# Patient Record
Sex: FEMALE | Race: WHITE | NOT HISPANIC OR LATINO | Employment: FULL TIME | URBAN - METROPOLITAN AREA
[De-identification: names, ages, dates, MRNs, and addresses within clinical notes are randomized per-mention and may not be internally consistent; named-entity substitution may affect disease eponyms.]

---

## 2023-11-29 ENCOUNTER — OFFICE VISIT (OUTPATIENT)
Dept: FAMILY MEDICINE | Facility: CLINIC | Age: 33
End: 2023-11-29
Payer: COMMERCIAL

## 2023-11-29 VITALS
SYSTOLIC BLOOD PRESSURE: 126 MMHG | HEART RATE: 50 BPM | RESPIRATION RATE: 16 BRPM | OXYGEN SATURATION: 98 % | WEIGHT: 147 LBS | DIASTOLIC BLOOD PRESSURE: 79 MMHG | HEIGHT: 66 IN | BODY MASS INDEX: 23.63 KG/M2 | TEMPERATURE: 97 F

## 2023-11-29 DIAGNOSIS — Z71.84 TRAVEL ADVICE ENCOUNTER: Primary | ICD-10-CM

## 2023-11-29 DIAGNOSIS — Z29.89 ALTITUDE SICKNESS PREVENTATIVE MEASURES: ICD-10-CM

## 2023-11-29 PROCEDURE — 90717 YELLOW FEVER VACCINE SUBQ: CPT | Mod: GA | Performed by: NURSE PRACTITIONER

## 2023-11-29 PROCEDURE — 99403 PREV MED CNSL INDIV APPRX 45: CPT | Mod: 25 | Performed by: NURSE PRACTITIONER

## 2023-11-29 PROCEDURE — 90715 TDAP VACCINE 7 YRS/> IM: CPT | Mod: GA | Performed by: NURSE PRACTITIONER

## 2023-11-29 PROCEDURE — 90472 IMMUNIZATION ADMIN EACH ADD: CPT | Mod: GA | Performed by: NURSE PRACTITIONER

## 2023-11-29 PROCEDURE — 90471 IMMUNIZATION ADMIN: CPT | Mod: GA | Performed by: NURSE PRACTITIONER

## 2023-11-29 PROCEDURE — 90632 HEPA VACCINE ADULT IM: CPT | Mod: GA | Performed by: NURSE PRACTITIONER

## 2023-11-29 PROCEDURE — 90691 TYPHOID VACCINE IM: CPT | Mod: GA | Performed by: NURSE PRACTITIONER

## 2023-11-29 RX ORDER — AZITHROMYCIN 500 MG/1
500 TABLET, FILM COATED ORAL DAILY
Qty: 3 TABLET | Refills: 0 | Status: SHIPPED | OUTPATIENT
Start: 2023-11-29 | End: 2023-12-02

## 2023-11-29 RX ORDER — ACETAZOLAMIDE 125 MG/1
TABLET ORAL
Qty: 10 TABLET | Refills: 0 | Status: SHIPPED | OUTPATIENT
Start: 2023-11-29

## 2023-11-29 RX ORDER — ONDANSETRON 4 MG/1
4 TABLET, ORALLY DISINTEGRATING ORAL EVERY 8 HOURS PRN
Qty: 10 TABLET | Refills: 0 | Status: SHIPPED | OUTPATIENT
Start: 2023-11-29 | End: 2024-06-17

## 2023-11-29 RX ORDER — ATOVAQUONE AND PROGUANIL HYDROCHLORIDE 250; 100 MG/1; MG/1
1 TABLET, FILM COATED ORAL DAILY
Qty: 16 TABLET | Refills: 0 | Status: SHIPPED | OUTPATIENT
Start: 2023-11-29

## 2023-11-29 ASSESSMENT — PAIN SCALES - GENERAL: PAINLEVEL: NO PAIN (0)

## 2023-11-29 NOTE — PATIENT INSTRUCTIONS
Thank you for visiting the [facility] : [phone]  Today November 29, 2023 you received the    Hepatitis A Vaccine - Please return on 5/27/24 or later for your 2nd and final dose.    Tetanus (Tdap) Vaccine    Typhoid - injectable. This vaccine is valid for two years.     Yellow Fever     Get Flu and Covid on your own    Follow up vaccine appointments can be made as a NURSE ONLY visit at the Travel Clinic, (BE PREPARED TO WAIT, ) or at designated [facility].    If you are receiving the Rabies vaccines series, it is important that you follow the exact schedule ordered.     Pre-travel     We recommend that you purchase Medical Evacuation Insurance prior to your departure.  Https://wwwnc.cdc.gov/travel/page/insurance    [place] your travel plans with the [place] Department of State through STEP ( Smart Traveler Enrollment Program ) https://step.[employer].gov.  STEP is a free service to allow [place] citizens and nationals traveling and living abroad to enroll their trip with the nearest [place] Embassy or Consulate.    Animal Exposure: Avoid all mammals even if they look healthy.  If there is a bite, scratch or even a lick, wash area immediately with soap and water for 15 minutes and seek medical care within 24 hours for evaluation of Rabies post exposure treatment.  Contact your Medical Evacuation Insurance.    Repiratory illness prevention strategies ( Covid and Influenza ) CDC recommendations:  Consider wearing a mask in crowded or poorly ventilated indoor areas, including on public transportation and in transportation hubs.  Hand washing: frequent, thorough handwashing with soap and water for 20 seconds. Use an alcohol-based hand  with at least 60% alcohol if soap and water are not readily available. Avoid touching face, nose, eyes, mouth unless you have done appropriate hand washing as above.  VACCINES: Staying up to date on COVID-19 vaccines significantly lowers the  risk of getting very sick, being hospitalized, or dying from COVID-19. CDC recommends that everyone stay up to date on their COVID-19 vaccines, especially people with weakened immune systems.    Travel Covid 19 Testing:  updated 12/06/2021  International travelers: Pre-travel:  See country specific Embassy websites or airline websites.    Post travel: CDC recommends getting tested 3-5 days after your trip     Post-travel illness:  Contact your provider or [facility] if you develop a fever, rash, cough, diarrhea or other symptoms for up to 1 year after travel.  Inform your healthcare provider when and where you traveled to.    Please call the [facility] with any questions [phone]  Or send your provider a 'My Chart' note.

## 2023-11-29 NOTE — PROGRESS NOTES
"Nurse Note ( Pre-Travel Consult)    Itinerary:  [place]    Departure Date: 1/14/24    Return Date: 1/28/24    Length of Trip 2 weeks    Reason for Travel: Tourism         Urban or rural: both    Accommodations: [employer]        IMMUNIZATION HISTORY  Have you received any immunizations within the past 4 weeks?  No  Have you ever fainted from having your blood drawn or from an injection?  No  Have you ever had a fever reaction to vaccination?  No  Have you ever had any bad reaction or side effect from any vaccination?  No  Have you ever had hepatitis A or B vaccine?  Yes  Do you live (or work closely) with anyone who has AIDS, an AIDS-like condition, any other immune disorder or who is on chemotherapy for cancer?  No  Do you have a family history of immunodeficiency?  No  Have you received any injection of immune globulin or any blood products during the past 12 months?  No    Patient roomed by Jens Hollingsworth  eRnata Arellano is a 33 year old femaleseen today alone for counsultation for international travel.   Patient will be departing in 6  week(s) and  traveling with organized tour group.      Patient itinerary :  will be in the urban region of  [facility] for 1.5 days > [facility] then \"rain [place]\" for 4-5 days then flight to the [facility] aboard for 7 days  which risk for Malaria, Yellow Fever, Rabies, food borne illnesses, and motor vehicle accidents. exposure.      Patient's activities will include sightseeing, jungle, and cruise.    Patient's country of birth is [place]    Special medical concerns: motion sickness  Pre-travel questionnaire was completed by patient and reviewed by provider.     Vitals: /79   Pulse 50   Temp 97  F (36.1  C) (Temporal)   Resp 16   Ht 1.664 m (5' 5.5\")   Wt 66.7 kg (147 lb)   LMP 11/13/2023   SpO2 98%   BMI 24.09 kg/m    BMI= Body mass index is 24.09 kg/m .    EXAM:  General:  Well-nourished, well-developed in no acute distress. "  Appears to be stated age, interacts appropriately and expresses understanding of information given to patient.    No current outpatient medications on file.     There is no problem list on file for this patient.    No Known Allergies      Immunizations discussed include:   Covid 19: will get update later   Hepatitis A:  Ordered/given today, risks, benefits and side effects reviewed  Hepatitis B: Up to date  Influenza: will get  Typhoid: Ordered/given today, risks, benefits and side effects reviewed  Rabies: Declined  reviewed managment of a animal bite or scratch (washing wound, seek medical care within 24 hours for post exposure prophylaxis )  Yellow Fever: Yellow Fever ordered/given today - side effects, precautions, allergies, risks discussed. Patient expressed understanding.  Liberian Encephalitis: Not indicated  Meningococcus: Not indicated  Tetanus/Diphtheria: Ordered/given today, risks, benefits and side effects reviewed  Measles/Mumps/Rubella: Up to date  Cholera: Not needed  Polio: Up to date  Pneumococcal: Under age of 65  Varicella: Ordered/given today, risks, benefits and side effects reviewed  Shingrix: Not indicated  HPV:  Not indicated     TB: low risk     Altitude Exposure on this trip: yes  Past tolerance to Altitude: headaches in [place]     ASSESSMENT/PLAN:  Maryam was seen today for travel clinic.    Diagnoses and all orders for this visit:    Travel advice encounter  -     atovaquone-proguanil (MALARONE) 250-100 MG tablet; Take 1 tablet by mouth daily Start 2 days before exposure to Malaria and continue daily till  7 days after exposure.  -     azithromycin (ZITHROMAX) 500 MG tablet; Take 1 tablet (500 mg) by mouth daily for 3 doses Take 1 tablet a day for up to 3 days for severe diarrhea    Other orders  -     HEPATITIS A 19+ (HAVRIX/VAQTA)  -     Cancel: COVID-19 12+ (2023-24) (PFIZER)  -     YELLOW FEVER, LIVE SQ  -     TYPHOID VACCINE, IM  -     TDAP 7+ (ADACEL,BOOSTRIX)      I have reviewed  general recommendations for safe travel   including: food/water precautions, insect precautions, safer sex   practices given high prevalence of Zika,   roadway safety. Educational materials and [employer] report provided.    Malaraia prophylaxis recommended: Malarone  Symptomatic treatment for traveler's diarrhea: azithromycin  Altitude illness prevention and treatment: Diamox prescription given with instructions on use and education provided on Acute altitude illness recognition and prevention.  Zofran for nausea rescue        Evacuation insurance advised and resources were provided to patient.    Total visit time 45 minutes  with over 50% of time spent counseling patient and shared decision making as detailed above.    Raysa Davila CNP  Certificate in Travel Health

## 2023-12-31 ENCOUNTER — HEALTH MAINTENANCE LETTER (OUTPATIENT)
Age: 33
End: 2023-12-31

## 2024-06-16 DIAGNOSIS — Z71.84 TRAVEL ADVICE ENCOUNTER: ICD-10-CM

## 2024-06-17 RX ORDER — ONDANSETRON 4 MG/1
TABLET, ORALLY DISINTEGRATING ORAL
Qty: 10 TABLET | Refills: 0 | Status: SHIPPED | OUTPATIENT
Start: 2024-06-17

## 2025-01-19 ENCOUNTER — HEALTH MAINTENANCE LETTER (OUTPATIENT)
Age: 35
End: 2025-01-19